# Patient Record
Sex: MALE | Race: WHITE | NOT HISPANIC OR LATINO | Employment: OTHER | URBAN - METROPOLITAN AREA
[De-identification: names, ages, dates, MRNs, and addresses within clinical notes are randomized per-mention and may not be internally consistent; named-entity substitution may affect disease eponyms.]

---

## 2024-02-22 ENCOUNTER — OFFICE VISIT (OUTPATIENT)
Dept: ORTHOPEDIC SURGERY | Facility: CLINIC | Age: 74
End: 2024-02-22
Payer: COMMERCIAL

## 2024-02-22 DIAGNOSIS — M19.071 PRIMARY OSTEOARTHRITIS OF RIGHT FOOT: Primary | ICD-10-CM

## 2024-02-22 PROCEDURE — 99213 OFFICE O/P EST LOW 20 MIN: CPT | Performed by: ORTHOPAEDIC SURGERY

## 2024-02-22 PROCEDURE — 1036F TOBACCO NON-USER: CPT | Performed by: ORTHOPAEDIC SURGERY

## 2024-02-22 PROCEDURE — 1159F MED LIST DOCD IN RCRD: CPT | Performed by: ORTHOPAEDIC SURGERY

## 2024-02-22 NOTE — PROGRESS NOTES
This 73-year-old gentleman is here because he needs new orthotics.  He notes without new orthotics he has a lot of pain particularly in his right foot.    Review of systems:  A complete review of the patient's past medical history and review of 30 systems and all medications and allergies was performed. Please see adult medical record for details.    A Family history for genetic or familial inheritance issues and Social history including smoking history, alcohol consumption and exercise activities was also reviewed and significant findings noted in the patients history of present illness.    Physical Exam:  The patient is well-nourished and well-developed and in no acute distress. The patient displayed normal mood and affect. The patient's pupils were equal, round sclerae are white. Patient's respirations appear to be regular and unlabored.    Examination of the patient's right foot and ankle revealed the following. The patient's gait and stance revealed marked asymmetrically increased pes planus and pronation on the right foot.  There did not appear to be any skin abnormalities or lymphangitis. There was no swelling noted and no erythema.  There was right foot fixed hindfoot valgus.    There was no tenderness to palpation.  Ankle range of motion was full.  Subtalar motion was minimal midtarsal motion was restricted.  The Silfverskiold test was positive.   The neurovascular examination was intact. The neurological exam including motor and sensory exam was performed. The vascular examination including palpation of pulses and capillary refill of the foot was performed and determined to be intact.    Examination of the right foot was unremarkable with pes planus.        Impression:   It is my impression this patient has collapse of the right hindfoot with fixed hindfoot valgus secondary to degenerative arthritis.      I have prescribed bilateral custom total contact orthotics for the patient.  I would be delighted to  see the patient back the future should  they  have further problems.    Note dictated with VisionGate software.  Completed without full type editing and sent to avoid delay.

## 2024-02-22 NOTE — LETTER
February 22, 2024     Jd Martin MD  [address]  [address]  [facility] [address]    Patient: Mina Leo   YOB: 1950   Date of Visit: 2/22/2024       Dear Dr. Jd Martin MD:    Thank you for referring Mina Leo to me for evaluation. Below are my notes for this consultation.  If you have questions, please do not hesitate to call me. I look forward to following your patient along with you.       Sincerely,     Scott Miranda MD      CC: No Recipients  ______________________________________________________________________________________    This 73-year-old gentleman is here because he needs new orthotics.  He notes without new orthotics he has a lot of pain particularly in his right foot.    Review of systems:  A complete review of the patient's past medical history and review of 30 systems and all medications and allergies was performed. Please see adult medical record for details.    A Family history for genetic or familial inheritance issues and Social history including smoking history, alcohol consumption and exercise activities was also reviewed and significant findings noted in the patients history of present illness.    Physical Exam:  The patient is well-nourished and well-developed and in no acute distress. The patient displayed normal mood and affect. The patient's pupils were equal, round sclerae are white. Patient's respirations appear to be regular and unlabored.    Examination of the patient's right foot and ankle revealed the following. The patient's gait and stance revealed marked asymmetrically increased pes planus and pronation on the right foot.  There did not appear to be any skin abnormalities or lymphangitis. There was no swelling noted and no erythema.  There was right foot fixed hindfoot valgus.    There was no tenderness to palpation.  Ankle range of motion was full.  Subtalar motion was minimal midtarsal motion was restricted.  The  Silfverskiold test was positive.   The neurovascular examination was intact. The neurological exam including motor and sensory exam was performed. The vascular examination including palpation of pulses and capillary refill of the foot was performed and determined to be intact.    Examination of the right foot was unremarkable with pes planus.        Impression:   It is my impression this patient has collapse of the right hindfoot with fixed hindfoot valgus secondary to degenerative arthritis.      I have prescribed bilateral custom total contact orthotics for the patient.  I would be delighted to see the patient back the future should  they  have further problems.    Note dictated with 8020select software.  Completed without full type editing and sent to avoid delay.

## 2024-03-07 ENCOUNTER — HOSPITAL ENCOUNTER (OUTPATIENT)
Dept: RADIOLOGY | Facility: HOSPITAL | Age: 74
Discharge: HOME | End: 2024-03-07
Payer: COMMERCIAL

## 2024-03-07 ENCOUNTER — OFFICE VISIT (OUTPATIENT)
Dept: ORTHOPEDIC SURGERY | Facility: HOSPITAL | Age: 74
End: 2024-03-07
Payer: COMMERCIAL

## 2024-03-07 DIAGNOSIS — G89.29 CHRONIC PAIN OF LEFT KNEE: ICD-10-CM

## 2024-03-07 DIAGNOSIS — M17.12 LOCALIZED OSTEOARTHRITIS OF LEFT KNEE: Primary | ICD-10-CM

## 2024-03-07 DIAGNOSIS — M25.562 CHRONIC PAIN OF LEFT KNEE: ICD-10-CM

## 2024-03-07 PROCEDURE — 1036F TOBACCO NON-USER: CPT | Performed by: FAMILY MEDICINE

## 2024-03-07 PROCEDURE — 1159F MED LIST DOCD IN RCRD: CPT | Performed by: FAMILY MEDICINE

## 2024-03-07 PROCEDURE — 73564 X-RAY EXAM KNEE 4 OR MORE: CPT | Mod: LEFT SIDE | Performed by: RADIOLOGY

## 2024-03-07 PROCEDURE — 99204 OFFICE O/P NEW MOD 45 MIN: CPT | Performed by: FAMILY MEDICINE

## 2024-03-07 PROCEDURE — 73564 X-RAY EXAM KNEE 4 OR MORE: CPT | Mod: LT

## 2024-03-07 PROCEDURE — 20610 DRAIN/INJ JOINT/BURSA W/O US: CPT | Performed by: FAMILY MEDICINE

## 2024-03-07 PROCEDURE — 2500000005 HC RX 250 GENERAL PHARMACY W/O HCPCS: Performed by: FAMILY MEDICINE

## 2024-03-07 PROCEDURE — 99214 OFFICE O/P EST MOD 30 MIN: CPT | Performed by: FAMILY MEDICINE

## 2024-03-07 PROCEDURE — 2500000004 HC RX 250 GENERAL PHARMACY W/ HCPCS (ALT 636 FOR OP/ED): Performed by: FAMILY MEDICINE

## 2024-03-07 RX ORDER — TRIAMCINOLONE ACETONIDE 40 MG/ML
40 INJECTION, SUSPENSION INTRA-ARTICULAR; INTRAMUSCULAR
Status: COMPLETED | OUTPATIENT
Start: 2024-03-07 | End: 2024-03-07

## 2024-03-07 RX ORDER — LIDOCAINE HYDROCHLORIDE 10 MG/ML
4 INJECTION INFILTRATION; PERINEURAL
Status: COMPLETED | OUTPATIENT
Start: 2024-03-07 | End: 2024-03-07

## 2024-03-07 RX ADMIN — TRIAMCINOLONE ACETONIDE 40 MG: 400 INJECTION, SUSPENSION INTRA-ARTICULAR; INTRAMUSCULAR at 16:07

## 2024-03-07 RX ADMIN — LIDOCAINE HYDROCHLORIDE 4 ML: 10 INJECTION, SOLUTION INFILTRATION; PERINEURAL at 16:07

## 2024-03-07 NOTE — PROGRESS NOTES
** Please excuse any errors in grammar or translation related to this dictation. Voice recognition software was utilized to prepare this document. **    Assessment & Plan:  Clinical presentation is most consistent with exacerbation of left knee arthritis.     Discuss with patient the nature of this disease and how it can be progressive.  Discuss non-operative treatment options to include exercise to improve quad & hamstring strength, activity modifications as needed, prescription and otc analgesics, corticosteroid injection, viscosupplementation injection.  Given current symptoms and failure to respond to other treatments to date, patient elects for treatment with CSI. Informed patient that steroid injection can be repeated every 3 or more months as symptoms dictate. For intermittent symptoms can utilize over-the-counter NSAID or acetaminophen, ice pack or heating pad. Follow-up in 3 months or as needed.  Did discuss with patient that given the severity of his knee arthritis ultimately will likely require knee arthroplasty at a future date.  All questions answered and patient is agreeable to this plan.     Chief complaint:  Left knee pain    HPI:  74 y/o patient, hx of left knee arthroscopy 2001 Dr. Neri, presents with left knee pain.  This complaint has been ongoing for years intermittently but has progressed the past 4 months.  There is swelling present.  No mechanism of injury reported at onset. Symptoms have progressively worsened with time.  Pain is most prominent at medial knee. To date, patient has tried a variety of treatments to include tylenol, ibuprofen, aleve, meloxicam, icyhot with little sustained effect.  He reports having steroid injection several years ago which mitigate the pain when he had pain exacerbated.    Exam:  LEFT Knee examined. Small effusion. No ecchymosis or erythema.  AROM from 0 to 130 deg with 5/5 strength. SILT overlying knee. Motion crepitus present. Tenderness along medial  joint line.  No popliteal mass palpated. Negative anterior and posterior drawer.  No laxity to varus or valgus stress at 0 or 30 deg.  No patellar apprehension.  [ -] Xavier    Results:  X-rays of left knee obtained 3/7/2024 reviewed and independent interpreted as advanced medial and patellofemoral compartment degenerative changes.    Procedure:  Patient ID: Mina Leo is a 73 y.o. male.    L Inj/Asp: L knee on 3/7/2024 4:07 PM  Indications: pain  Details: 25 G needle, anterolateral approach  Medications: 40 mg triamcinolone acetonide 40 mg/mL; 4 mL lidocaine 10 mg/mL (1 %)  Outcome: tolerated well, no immediate complications  Procedure, treatment alternatives, risks and benefits explained, specific risks discussed. Consent was given by the patient. Immediately prior to procedure a time out was called to verify the correct patient, procedure, equipment, support staff and site/side marked as required. Patient was prepped and draped in the usual sterile fashion.

## 2024-06-13 ENCOUNTER — DOCUMENTATION (OUTPATIENT)
Dept: PHYSICAL THERAPY | Facility: CLINIC | Age: 74
End: 2024-06-13
Payer: COMMERCIAL

## 2024-06-13 NOTE — PROGRESS NOTES
Physical Therapy    Discharge Summary    Name: Mina Leo  MRN: [id number]  : 1950  Date: 24    Discharge Summary: PT    Discharge Information:     Therapy Summary:     Discharge Status: discharge to [facility]      Rehab Discharge Reason: primary therapist no longer at this location

## 2024-08-19 DIAGNOSIS — Z01.812 ENCOUNTER FOR PREPROCEDURAL LABORATORY EXAMINATION: Primary | ICD-10-CM

## 2024-08-23 ENCOUNTER — LAB (OUTPATIENT)
Dept: LAB | Facility: LAB | Age: 74
End: 2024-08-23
Payer: COMMERCIAL

## 2024-08-23 DIAGNOSIS — Z01.812 ENCOUNTER FOR PREPROCEDURAL LABORATORY EXAMINATION: ICD-10-CM

## 2024-08-23 LAB
ALBUMIN SERPL BCP-MCNC: 4.5 G/DL (ref 3.4–5)
ANION GAP SERPL CALC-SCNC: 14 MMOL/L (ref 10–20)
BUN SERPL-MCNC: 13 MG/DL (ref 6–23)
CALCIUM SERPL-MCNC: 10.3 MG/DL (ref 8.6–10.6)
CHLORIDE SERPL-SCNC: 102 MMOL/L (ref 98–107)
CO2 SERPL-SCNC: 28 MMOL/L (ref 21–32)
CREAT SERPL-MCNC: 1 MG/DL (ref 0.5–1.3)
EGFRCR SERPLBLD CKD-EPI 2021: 80 ML/MIN/1.73M*2
GLUCOSE SERPL-MCNC: 110 MG/DL (ref 74–99)
PHOSPHATE SERPL-MCNC: 3 MG/DL (ref 2.5–4.9)
POTASSIUM SERPL-SCNC: 4.1 MMOL/L (ref 3.5–5.3)
SODIUM SERPL-SCNC: 140 MMOL/L (ref 136–145)

## 2024-08-23 PROCEDURE — 80069 RENAL FUNCTION PANEL: CPT

## 2024-08-23 PROCEDURE — 36415 COLL VENOUS BLD VENIPUNCTURE: CPT

## 2024-08-27 ENCOUNTER — HOSPITAL ENCOUNTER (OUTPATIENT)
Dept: RADIOLOGY | Facility: CLINIC | Age: 74
Discharge: HOME | End: 2024-08-27
Payer: COMMERCIAL

## 2024-08-27 DIAGNOSIS — M54.2 CERVICALGIA: ICD-10-CM

## 2024-08-27 PROCEDURE — 70492 CT SFT TSUE NCK W/O & W/DYE: CPT

## 2024-08-27 PROCEDURE — 70492 CT SFT TSUE NCK W/O & W/DYE: CPT | Performed by: RADIOLOGY

## 2024-08-27 PROCEDURE — 2550000001 HC RX 255 CONTRASTS

## 2024-09-23 DIAGNOSIS — M19.071 PRIMARY OSTEOARTHRITIS OF RIGHT FOOT: ICD-10-CM

## 2024-10-10 ENCOUNTER — EVALUATION (OUTPATIENT)
Dept: PHYSICAL THERAPY | Facility: CLINIC | Age: 74
End: 2024-10-10
Payer: COMMERCIAL

## 2024-10-10 DIAGNOSIS — M54.2 CERVICALGIA: Primary | ICD-10-CM

## 2024-10-10 PROCEDURE — 97161 PT EVAL LOW COMPLEX 20 MIN: CPT | Mod: GP

## 2024-10-10 PROCEDURE — 97110 THERAPEUTIC EXERCISES: CPT | Mod: GP

## 2024-10-10 PROCEDURE — 97140 MANUAL THERAPY 1/> REGIONS: CPT | Mod: GP

## 2024-10-10 NOTE — PROGRESS NOTES
Physical Therapy Evaluation    Patient Name: Mina Leo  MRN: [id number]  Today's Date: 10/10/2024  Time Calculation  Start Time: 1105  Stop Time: 1153  Time Calculation (min): 48 min  PT Evaluation Time Entry  PT Evaluation (Low) Time Entry: 20  PT Therapeutic Procedures Time Entry  Manual Therapy Time Entry: 8  Therapeutic Exercise Time Entry: 15        Insurance: [employer]  Plan of Care: 10/10/24 to 1/8/25  Visit #1    Referring MD Jd Martin  Imaging Performed CT showed multilevel degenerative appearing spondylolisthesis with moderate left C4-C5 neural foraminal narrowing.     Assessment     Mina Leo is a 74 y.o. referred for neck pain. Patient demonstrates decreased cervical ROM, tenderness in cervical spine, and pain. At this time, patient is limited with turning head while driving, looking down while reading/using ipad, and swimming. Patient will benefit from physical therapy services to address stated impairments and improve functional mobility.    Plan  Treatment/Interventions: Cryotherapy, Education/ Instruction, Hot pack, Manual therapy, Mechanical traction, Self care/ home management, Therapeutic activities, Therapeutic exercises  PT Plan: Skilled PT  PT Frequency: 1 time per week  Duration: 8-10 weeks  Onset Date: 07/01/24  Certification Period Start Date: 10/10/24  Certification Period End Date: 01/08/25  Number of Treatments Authorized: 50 visits  Rehab Potential: Good  Plan of Care Agreement: Patient    Primary diagnosis: cervicalgia   Current Problem  Problem List Items Addressed This Visit    None  Visit Diagnoses         Codes    Cervicalgia    -  Primary M54.2    Relevant Orders    Follow Up In Physical Therapy              General:  General  Reason for Referral: Neck pain  Referred By: Jd Martin  Past Medical History Relevant to Rehab: Back and L knee pain  Preferred Learning Style: verbal, visual, written  Precautions:  Precautions  STEADI Fall Risk Score (The  score of 4 or more indicates an increased risk of falling): 0  Precautions Comment: None  Vital Signs:       Subjective:  Chief complaints: Neck pain, some R elbow soreness  Onset/Surgery Date: 7/1/24  Mechanism of Injury: Woke up one morning with pain  Previous History: Not with neck, but has had PT for back and knee pain  Personal Factors that may impact care: None     Pain:  Current: 2/10 Best: 0/10 Worst: 4-5/10   Location: L sided neck pain   Type: Ache, pinch   Aggravators: looking down, looking over shoulder areli L, swimming areli breat stroke   Alleviators: ibuprofen/tylenol 1-2x/day, icy hot   Numbness/tingling? No    Function:   Work/Recreation: walking 3 miles a day, swimming    Prior Level: Full   Current limitations: swimming normal amounts, looking down, looking over L shoulder   Condition: Worsening/Unchanged/Improving     Home Situation:    No concerns about home set up    Any falls in the past year No     Injuries? N/a    Fear of falling? No    Sleep:    Getting to sleep Yes   Disturbed Yes     Goals for Therapy:    Improve pain and motion    Objective   Palpation: Some mild tenderness to Pas in C-spine  Observation: Symmetrical and normal B scapular positioning    ROM/Flexibility:    Cervical Flexion: 46      Extension: 35, pain       Sidebend R / L: 12 / 9      Rotation R / L: 42 / 46, pain      Strength R / L :     Serratus Anterior:             5 / 5  Shoulder Flexion:      5 / 5    Shoulder Abduction:        5 / 5    Shoulder ER:                     5 / 5    Shoulder IR:                      5 / 5    Elbow Flexion:                   5 / 5    Elbow Extension:               5 / 5    Wrist Flexion:                    5 / 5    Wrist Extension:                5 / 5    :                                  70 / 75 lbs     Neurological: Sensation is intact and symmetrical in BUEs.      Special Tests:     Compression: -     Spurling's: - / +     Outcome Measure:    NDI: 6 / 50 = 12  "%      Treatment:  Manual therapy: 8 min supine cervical mobs, manual traction  Therapeutic Exercise:   Supine wand flexion 5\" x 10   Tried supine wand ER in abd but caused R elbow pain   Supine chin tuck, towel roll 5\" x 10   Seated chin tuck 5\" x 10   Seated chin tuck w/rotation 5\" x 5 ea   Seated rotation snag 5\" x 5 ea    Access Code: [email or web address]  URL: [email or web address]  Date: 10/10/2024  Prepared by: Eveline Neri    Exercises  - Supine Cervical Retraction with Towel  - 2 x daily - 7 x weekly - 1 sets - 10 reps - 5 second hold  - Supine Shoulder Flexion Extension AAROM with Dowel  - 2 x daily - 7 x weekly - 1 sets - 10 reps - 5 second hold  - Seated Cervical Retraction  - 2 x daily - 7 x weekly - 1 sets - 10 reps - 5 second hold  - Seated Cervical Retraction and Rotation  - 2 x daily - 7 x weekly - 1 sets - 5-10 reps - 5 second hold  - Seated Assisted Cervical Rotation with Towel  - 2 x daily - 7 x weekly - 1 sets - 5-10 reps - 5 second hold    Goals:  Active       PT Problem       Patient will be independent with home exercise program for home maintenance.        Start:  10/11/24    Expected End:  11/10/24            Pt will increase cervical rotation to 50 degrees to facilitate improvement in ability to turn head while driving.        Start:  10/11/24    Expected End:  11/10/24            Pt will report 50% decrease in L sided neck pain with swimming to indicate improving mobility/ROM in cervical/thoracic spine.        Start:  10/11/24    Expected End:  12/10/24            Patient will decrease score on NDI to less than 10 % to indicate decreased pain and increased functional level.         Start:  10/11/24    Expected End:  12/10/24                             "

## 2024-10-10 NOTE — LETTER
October 11, 2024    dJ Martin MD  [address], [address]  [place] [address]    Patient: Mina Leo   YOB: 1950   Date of Visit: 10/10/2024       Dear Jd Martin MD  [address], [address]  [place] [address]    The attached plan of care is being sent to you because your patient’s medical reimbursement requires that you certify the plan of care. Your signature is required to allow uninterrupted insurance coverage.      You may indicate your approval by signing below and faxing this form back to us at Dept Fax: [phone].    Please call Dept: [phone] with any questions or concerns.    Thank you for this referral,        Eveline Neri, PT  [place] [address] [facility] [address]    Payer: Payor: [employer] / Plan: [employer] / Product Type: *No Product type* /                                                                         Date:     Dear Eveline Neri, PT,     Re: Mr. Mina Leo, MRN:[id number]    I certify that I have reviewed the attached plan of care and it is medically necessary for Mr. Mina Leo (1950) who is under my care.          ______________________________________                    _________________  Provider name and credentials                                           Date and time                                                                                           Plan of Care 10/10/24   Effective from: 10/10/2024  Effective to: 1/8/2025    Plan ID: [id number]            Participants as of Finalize on 10/11/2024    Name Type Comments Contact Info    Jd Martin MD PCP - General  [phone]    Eveline Neri, PT Physical Therapist  [phone]       Last Plan Note     Author: Eveline Neri PT Status: Incomplete Last edited: 10/10/2024 11:00 AM       Physical Therapy Evaluation    Patient Name: Mina SCOTT  Ahmet  MRN: [id number]  Today's Date: 10/10/2024  Time Calculation  Start Time: 1105  Stop Time: 1153  Time Calculation (min): 48 min  PT Evaluation Time Entry  PT Evaluation (Low) Time Entry: 20  PT Therapeutic Procedures Time Entry  Manual Therapy Time Entry: 8  Therapeutic Exercise Time Entry: 15        Insurance: [facility]  Plan of Care: 10/10/24 to 1/8/25  Visit #1    Referring MD Jd Martin  Imaging Performed CT showed multilevel degenerative appearing spondylolisthesis with moderate left C4-C5 neural foraminal narrowing.     Assessment     Mina Leo is a 74 y.o. referred for neck pain. Patient demonstrates decreased cervical ROM, tenderness in cervical spine, and pain. At this time, patient is limited with turning head while driving, looking down while reading/using ipad, and swimming. Patient will benefit from physical therapy services to address stated impairments and improve functional mobility.    Plan  Treatment/Interventions: Cryotherapy, Education/ Instruction, Hot pack, Manual therapy, Mechanical traction, Self care/ home management, Therapeutic activities, Therapeutic exercises  PT Plan: Skilled PT  PT Frequency: 1 time per week  Duration: 8-10 weeks  Onset Date: 07/01/24  Certification Period Start Date: 10/10/24  Certification Period End Date: 01/08/25  Number of Treatments Authorized: 50 visits  Rehab Potential: Good  Plan of Care Agreement: Patient    Primary diagnosis: cervicalgia   Current Problem  Problem List Items Addressed This Visit    None  Visit Diagnoses         Codes    Cervicalgia    -  Primary M54.2    Relevant Orders    Follow Up In Physical Therapy              General:  General  Reason for Referral: Neck pain  Referred By: Jd Martin  Past Medical History Relevant to Rehab: Back and L knee pain  Preferred Learning Style: verbal, visual, written  Precautions:  Precautions  STEADI Fall Risk Score (The score of 4 or more indicates an increased risk of  falling): 0  Precautions Comment: None  Vital Signs:       Subjective:  Chief complaints: Neck pain, some R elbow soreness  Onset/Surgery Date: 7/1/24  Mechanism of Injury: Woke up one morning with pain  Previous History: Not with neck, but has had PT for back and knee pain  Personal Factors that may impact care: None     Pain:  Current: 2/10 Best: 0/10 Worst: 4-5/10   Location: L sided neck pain   Type: Ache, pinch   Aggravators: looking down, looking over shoulder areli L, swimming areli breat stroke   Alleviators: ibuprofen/tylenol 1-2x/day, icy hot   Numbness/tingling? No    Function:   Work/Recreation: walking 3 miles a day, swimming    Prior Level: Full   Current limitations: swimming normal amounts, looking down, looking over L shoulder   Condition: Worsening/Unchanged/Improving     Home Situation:    No concerns about home set up    Any falls in the past year No     Injuries? N/a    Fear of falling? No    Sleep:    Getting to sleep Yes   Disturbed Yes     Goals for Therapy:    Improve pain and motion    Objective  Palpation: Some mild tenderness to Pas in C-spine  Observation: Symmetrical and normal B scapular positioning    ROM/Flexibility:    Cervical Flexion: 46      Extension: 35, pain       Sidebend R / L: 12 / 9      Rotation R / L: 42 / 46, pain      Strength R / L :     Serratus Anterior:             5 / 5  Shoulder Flexion:      5 / 5    Shoulder Abduction:        5 / 5    Shoulder ER:                     5 / 5    Shoulder IR:                      5 / 5    Elbow Flexion:                   5 / 5    Elbow Extension:               5 / 5    Wrist Flexion:                    5 / 5    Wrist Extension:                5 / 5    :                                  70 / 75 lbs     Neurological: Sensation is intact and symmetrical in BUEs.      Special Tests:     Compression: -     Spurling's: - / +     Outcome Measure:    NDI: 6 / 50 = 12 %      Treatment:  Manual therapy: 8 min supine cervical mobs, manual  "traction  Therapeutic Exercise:   Supine wand flexion 5\" x 10   Tried supine wand ER in abd but caused R elbow pain   Supine chin tuck, towel roll 5\" x 10   Seated chin tuck 5\" x 10   Seated chin tuck w/rotation 5\" x 5 ea   Seated rotation snag 5\" x 5 ea    Access Code: [email or web address]  URL: [email or web address]  Date: 10/10/2024  Prepared by: Eveline Neri    Exercises  - Supine Cervical Retraction with Towel  - 2 x daily - 7 x weekly - 1 sets - 10 reps - 5 second hold  - Supine Shoulder Flexion Extension AAROM with Dowel  - 2 x daily - 7 x weekly - 1 sets - 10 reps - 5 second hold  - Seated Cervical Retraction  - 2 x daily - 7 x weekly - 1 sets - 10 reps - 5 second hold  - Seated Cervical Retraction and Rotation  - 2 x daily - 7 x weekly - 1 sets - 5-10 reps - 5 second hold  - Seated Assisted Cervical Rotation with Towel  - 2 x daily - 7 x weekly - 1 sets - 5-10 reps - 5 second hold    Goals:  Active       PT Problem       Patient will be independent with home exercise program for home maintenance.        Start:  10/11/24    Expected End:  11/10/24            Pt will increase cervical rotation to 50 degrees to facilitate improvement in ability to turn head while driving.        Start:  10/11/24    Expected End:  11/10/24            Pt will report 50% decrease in L sided neck pain with swimming to indicate improving mobility/ROM in cervical/thoracic spine.        Start:  10/11/24    Expected End:  12/10/24            Patient will decrease score on NDI to less than 10 % to indicate decreased pain and increased functional level.         Start:  10/11/24    Expected End:  12/10/24                                    Current Participants as of 10/11/2024    Name Type Comments Contact Info    Jd Martin MD PCP - General  [phone]    Signature pending    Eveline Neri, PT Physical Therapist  [phone]          "

## 2024-10-11 ASSESSMENT — ENCOUNTER SYMPTOMS
DEPRESSION: 0
LOSS OF SENSATION IN FEET: 0
OCCASIONAL FEELINGS OF UNSTEADINESS: 0

## 2024-10-29 ENCOUNTER — TREATMENT (OUTPATIENT)
Dept: PHYSICAL THERAPY | Facility: CLINIC | Age: 74
End: 2024-10-29
Payer: COMMERCIAL

## 2024-10-29 DIAGNOSIS — M54.2 CERVICALGIA: ICD-10-CM

## 2024-10-29 PROCEDURE — 97110 THERAPEUTIC EXERCISES: CPT | Mod: GP,CQ

## 2024-10-29 PROCEDURE — 97140 MANUAL THERAPY 1/> REGIONS: CPT | Mod: GP,CQ

## 2024-11-12 ENCOUNTER — TREATMENT (OUTPATIENT)
Dept: PHYSICAL THERAPY | Facility: CLINIC | Age: 74
End: 2024-11-12
Payer: COMMERCIAL

## 2024-11-12 DIAGNOSIS — M54.2 CERVICALGIA: ICD-10-CM

## 2024-11-12 PROCEDURE — 97140 MANUAL THERAPY 1/> REGIONS: CPT | Mod: GP

## 2024-11-12 PROCEDURE — 97110 THERAPEUTIC EXERCISES: CPT | Mod: GP

## 2024-11-12 NOTE — PROGRESS NOTES
"Physical Therapy Evaluation    Patient Name: Mina Leo  MRN: [id number]  Today's Date: 10/10/2024  Time Calculation  Start Time: 1107  Stop Time: 1133  Time Calculation (min): 26 min     PT Therapeutic Procedures Time Entry  Manual Therapy Time Entry: 15  Therapeutic Exercise Time Entry: 10        Insurance: [facility]  Plan of Care: 10/10/24 to 1/8/25  Visit #3    Referring MD Jd Martin  Imaging Performed CT showed multilevel degenerative appearing spondylolisthesis with moderate left C4-C5 neural foraminal narrowing.     Assessment  Sx's appear to be resolving. Some R elbow sx's w/ SNAGs not resolved w/ postural correction.  Discussed possible Tuck w/ rot and OP to substitute    Plan  Follow up as needed    Primary diagnosis: cervicalgia   Current Problem  Problem List Items Addressed This Visit    None  Visit Diagnoses         Codes    Cervicalgia     M54.2              Subjective:  \"I think I can just keep going with the exercises on my own for the rest of the year and see how I do\"     Pain:  Current: 2/10 Best: 0/10 Worst: 4-5/10        Objective   Cervical AROM  Flexion: 46                          Extension: 52, pain                           Sidebend R / L: 18 / 12                          Rotation R / L: 55 / 52, pain     Treatment:  Manual therapy: 15 min supine cervical mobs, manual traction  Therapeutic Exercise:   Review of chin tucks, tucks with rotation, and rotation with overpressure      Goals:  Active       PT Problem       Patient will be independent with home exercise program for home maintenance.        Start:  10/11/24    Expected End:  11/10/24            Pt will increase cervical rotation to 50 degrees to facilitate improvement in ability to turn head while driving.        Start:  10/11/24    Expected End:  11/10/24            Pt will report 50% decrease in L sided neck pain with swimming to indicate improving mobility/ROM in cervical/thoracic spine.        Start:  10/11/24    " Expected End:  12/10/24            Patient will decrease score on NDI to less than 10 % to indicate decreased pain and increased functional level.         Start:  10/11/24    Expected End:  12/10/24